# Patient Record
Sex: MALE | Race: WHITE | Employment: FULL TIME | URBAN - METROPOLITAN AREA
[De-identification: names, ages, dates, MRNs, and addresses within clinical notes are randomized per-mention and may not be internally consistent; named-entity substitution may affect disease eponyms.]

---

## 2021-03-15 ENCOUNTER — APPOINTMENT (OUTPATIENT)
Dept: GENERAL RADIOLOGY | Age: 66
End: 2021-03-15
Attending: EMERGENCY MEDICINE
Payer: COMMERCIAL

## 2021-03-15 ENCOUNTER — HOSPITAL ENCOUNTER (EMERGENCY)
Age: 66
Discharge: HOME | End: 2021-03-15
Attending: EMERGENCY MEDICINE
Payer: COMMERCIAL

## 2021-03-15 VITALS
RESPIRATION RATE: 20 BRPM | BODY MASS INDEX: 35.35 KG/M2 | WEIGHT: 233.25 LBS | OXYGEN SATURATION: 96 % | DIASTOLIC BLOOD PRESSURE: 70 MMHG | SYSTOLIC BLOOD PRESSURE: 136 MMHG | TEMPERATURE: 98 F | HEIGHT: 68 IN | HEART RATE: 67 BPM

## 2021-03-15 DIAGNOSIS — S61.012A THUMB LACERATION, LEFT, INITIAL ENCOUNTER: Primary | ICD-10-CM

## 2021-03-15 DIAGNOSIS — S66.229A LACERATION OF EXTENSOR MUSCLE AND TENDON OF THUMB AT WRIST AND HAND LEVEL: ICD-10-CM

## 2021-03-15 PROCEDURE — 90715 TDAP VACCINE 7 YRS/> IM: CPT | Performed by: EMERGENCY MEDICINE

## 2021-03-15 PROCEDURE — 90471 IMMUNIZATION ADMIN: CPT

## 2021-03-15 PROCEDURE — 74011250636 HC RX REV CODE- 250/636: Performed by: EMERGENCY MEDICINE

## 2021-03-15 PROCEDURE — 74011250637 HC RX REV CODE- 250/637: Performed by: EMERGENCY MEDICINE

## 2021-03-15 PROCEDURE — 75810000293 HC SIMP/SUPERF WND  RPR

## 2021-03-15 PROCEDURE — 74011000250 HC RX REV CODE- 250: Performed by: EMERGENCY MEDICINE

## 2021-03-15 PROCEDURE — 73140 X-RAY EXAM OF FINGER(S): CPT

## 2021-03-15 PROCEDURE — 99284 EMERGENCY DEPT VISIT MOD MDM: CPT

## 2021-03-15 RX ORDER — ROSUVASTATIN CALCIUM 10 MG/1
10 TABLET, COATED ORAL
COMMUNITY

## 2021-03-15 RX ORDER — AMOXICILLIN AND CLAVULANATE POTASSIUM 875; 125 MG/1; MG/1
1 TABLET, FILM COATED ORAL 2 TIMES DAILY
Qty: 20 TAB | Refills: 0 | Status: SHIPPED | OUTPATIENT
Start: 2021-03-15 | End: 2021-03-25

## 2021-03-15 RX ORDER — LIDOCAINE HYDROCHLORIDE 10 MG/ML
5 INJECTION, SOLUTION EPIDURAL; INFILTRATION; INTRACAUDAL; PERINEURAL ONCE
Status: COMPLETED | OUTPATIENT
Start: 2021-03-15 | End: 2021-03-15

## 2021-03-15 RX ORDER — BACITRACIN 500 UNIT/G
1 PACKET (EA) TOPICAL 3 TIMES DAILY
Status: DISCONTINUED | OUTPATIENT
Start: 2021-03-15 | End: 2021-03-16 | Stop reason: HOSPADM

## 2021-03-15 RX ORDER — AMOXICILLIN AND CLAVULANATE POTASSIUM 875; 125 MG/1; MG/1
1 TABLET, FILM COATED ORAL
Status: COMPLETED | OUTPATIENT
Start: 2021-03-15 | End: 2021-03-15

## 2021-03-15 RX ADMIN — BACITRACIN 1 PACKET: 500 OINTMENT TOPICAL at 20:45

## 2021-03-15 RX ADMIN — AMOXICILLIN AND CLAVULANATE POTASSIUM 1 TABLET: 875; 125 TABLET, FILM COATED ORAL at 21:36

## 2021-03-15 RX ADMIN — LIDOCAINE HYDROCHLORIDE 5 ML: 10 INJECTION, SOLUTION EPIDURAL; INFILTRATION; INTRACAUDAL; PERINEURAL at 20:45

## 2021-03-15 RX ADMIN — TETANUS TOXOID, REDUCED DIPHTHERIA TOXOID AND ACELLULAR PERTUSSIS VACCINE, ADSORBED 0.5 ML: 5; 2.5; 8; 8; 2.5 SUSPENSION INTRAMUSCULAR at 21:48

## 2021-03-16 NOTE — ED NOTES
Wound repair done by Dr. Raj Leon and dressing applied  With finger splint. Patient is discharged home with prescriptions and instructions. Verbalized understanding of aftercare instructions.  Dressing dry and intact, Pain scale 0/10

## 2021-03-16 NOTE — ED PROVIDER NOTES
HPI   67-year-old male presents with left posterior thumb laceration occurring just prior to arrival in ED. Patient states she was using a knife to unwed to frozen hamburgers when it slipped slicing his left thumb. Unknown tetanus status. Denies numbness but patient states he is unable to fully extend his thumb. Patient is right-handed. Past Medical History:   Diagnosis Date    High cholesterol        History reviewed. No pertinent surgical history. History reviewed. No pertinent family history. Social History     Socioeconomic History    Marital status:      Spouse name: Not on file    Number of children: Not on file    Years of education: Not on file    Highest education level: Not on file   Occupational History    Not on file   Social Needs    Financial resource strain: Not on file    Food insecurity     Worry: Not on file     Inability: Not on file    Transportation needs     Medical: Not on file     Non-medical: Not on file   Tobacco Use    Smoking status: Never Smoker    Smokeless tobacco: Never Used   Substance and Sexual Activity    Alcohol use:  Yes     Alcohol/week: 1.7 standard drinks     Types: 2 Glasses of wine per week    Drug use: No    Sexual activity: Yes     Partners: Female   Lifestyle    Physical activity     Days per week: Not on file     Minutes per session: Not on file    Stress: Not on file   Relationships    Social connections     Talks on phone: Not on file     Gets together: Not on file     Attends Shinto service: Not on file     Active member of club or organization: Not on file     Attends meetings of clubs or organizations: Not on file     Relationship status: Not on file    Intimate partner violence     Fear of current or ex partner: Not on file     Emotionally abused: Not on file     Physically abused: Not on file     Forced sexual activity: Not on file   Other Topics Concern    Not on file   Social History Narrative    Not on file ALLERGIES: Patient has no known allergies. Review of Systems   Musculoskeletal: Negative for arthralgias. Skin: Positive for wound. Negative for rash. Neurological: Positive for weakness. Negative for numbness. All other systems reviewed and are negative. Vitals:    03/15/21 2030 03/15/21 2035 03/15/21 2037 03/15/21 2045   BP: (!) 147/61 (!) 161/76  125/66   Pulse:  70     Resp:  20     Temp:  96.8 °F (36 °C)     SpO2: 98% 98% 98% 97%   Weight:  105.8 kg (233 lb 4 oz)     Height:  5' 8\" (1.727 m)              Physical Exam   Physical Examination: General appearance - alert, well appearing, and in no distress, oriented to person, place, and time and normal appearing weight  Neurological - alert, oriented, normal speech, no focal findings or movement disorder noted  Musculoskeletal - no joint tenderness, deformity or swelling, 2.75 cm laceration to left posterior thigh between interphalangeal joints, patient is unable to fully extend his left thumb, cap refill less than 2 seconds, sensation intact  Extremities - peripheral pulses normal, no pedal edema, no clubbing or cyanosis  Skin - normal coloration and turgor, no rashes, no suspicious skin lesions noted  MDM  Number of Diagnoses or Management Options     Amount and/or Complexity of Data Reviewed  Tests in the radiology section of CPT®: ordered and reviewed  Discuss the patient with other providers: yes (ortho)  Independent visualization of images, tracings, or specimens: yes    Patient Progress  Patient progress: improved         WOUND REPAIR    Date/Time: 3/15/2021 9:00 PM  Performed by: attendingPreparation: skin prepped with Betadine and sterile field established  Pre-procedure re-eval: Immediately prior to the procedure, the patient was reevaluated and found suitable for the planned procedure and any planned medications.   Time out: Immediately prior to the procedure a time out was called to verify the correct patient, procedure, equipment, staff and marking as appropriate. .  Location details: left thumb  Wound length:2.6 - 7.5 cm  Anesthesia: local infiltration    Anesthesia:  Local Anesthetic: lidocaine 1% without epinephrine  Anesthetic total: 1 mL  Foreign bodies: no foreign bodies  Irrigation solution: saline  Irrigation method: syringe  Debridement: none  Skin closure: 5-0 nylon  Number of sutures: 5  Technique: simple and interrupted  Approximation: close  Dressing: antibiotic ointment, splint and pressure dressing  Patient tolerance: patient tolerated the procedure well with no immediate complications  My total time at bedside, performing this procedure was 1-15 minutes. Splint, Finger    Date/Time: 3/15/2021 9:00 PM  Performed by: Floyd Major MD  Authorized by: Floyd Major MD     Consent:     Consent obtained:  Verbal    Consent given by:  Patient    Risks discussed:  Discoloration, numbness, pain and swelling    Alternatives discussed:  No treatment, delayed treatment, alternative treatment and observation  Universal protocol:     Procedure explained and questions answered to patient or proxy's satisfaction: yes      Relevant documents present and verified: yes      Test results available and properly labeled: yes      Imaging studies available: yes      Required blood products, implants, devices, and special equipment available: yes      Site/side marked: yes      Immediately prior to procedure a time out was called: yes      Patient identity confirmed:  Verbally with patient, arm band and provided demographic data  Pre-procedure details:     Sensation:  Weakness    Skin color:  Normal  Procedure details:     Laterality:  Left    Location:  Finger    Finger:  L thumb    Strapping: no      Splint type:  Finger    Supplies:  Aluminum splint  Post-procedure details:     Pain:  Improved    Sensation:  Weakness    Skin color:  Normal    Patient tolerance of procedure:   Tolerated well, no immediate complications  Comments: Pt splinted in full extension. 9:11 PM  Discussed with Dr. Mode Grullon, orthopedic surgery, and Brittnee Ledezma. They recommend loose approximation of wound, splinting in full extension, Augmentin antibiotic prescription. Recommend follow-up with Dr. Cristobal Grover, orthopedic hand surgery tomorrow in the office to plan for definitive treatment. Discussed with patient he agrees with plan.

## 2021-03-16 NOTE — ED TRIAGE NOTES
Pt ambulates to treatment area he states that he wedged a knife between 2 frozen hamburgers and sliced his left thumb. It is actively bleeding.   approx 2.5 cm

## 2021-03-16 NOTE — DISCHARGE INSTRUCTIONS
We hope that we have addressed all of your medical concerns. The examination and treatment you received in the Emergency Department were for an emergent problem and were not intended as complete care. It is important that you follow up with your healthcare provider(s) for ongoing care. If your symptoms worsen or do not improve as expected, and you are unable to reach your usual health care provider(s), you should return to the Emergency Department.      Today's healthcare is undergoing tremendous change, and patient satisfaction surveys are one of the many tools to assess the quality of medical care.  You may receive a survey from the [employer] regarding your experience in the Emergency Department.  I hope that your experience has been completely positive, particularly the medical care that I provided.  As such, please participate in the survey; anything less than excellent does not meet my expectations or intentions.        [facility], [facility] and [facility] [employer] participate in nationally recognized quality of care measures.  If your blood pressure is greater than 120/80, as reported below, we urge that you seek medical care to address the potential of high blood pressure, commonly known as hypertension.   Hypertension can be hereditary or can be caused by certain medical conditions, pain, stress, or \"white coat syndrome.\"       Please make an appointment with your health care provider(s) for follow up of your Emergency Department visit.       VITALS:   Patient Vitals for the past 8 hrs:   Temp Pulse Resp BP SpO2   03/15/21 2045 -- -- -- 125/66 97 %   03/15/21 2037 -- -- -- -- 98 %   03/15/21 2035 96.8 °F (36 °C) 70 20 (!) 161/76 98 %   03/15/21 2030 -- -- -- (!) 147/61 98 %          Thank you for allowing us to provide you with medical care today.  We realize that you have many choices for your emergency care needs.  Please choose us in the future for any continued  health care needs. Gregoria Simpson Maker, [address]: [phone]            No results found for this or any previous visit (from the past 24 hour(s)). Xr Thumb Lt Min 2 V    Result Date: 3/15/2021  EXAM: XR THUMB LT MIN 2 V INDICATION: laceration. COMPARISON: None. FINDINGS: Three views of the left thumb demonstrate no fracture or foreign body. There is osteoarthritis of the IP joint. No fracture or foreign body.

## 2022-05-31 ENCOUNTER — HOSPITAL ENCOUNTER (EMERGENCY)
Age: 67
Discharge: HOME | End: 2022-05-31
Attending: EMERGENCY MEDICINE
Payer: COMMERCIAL

## 2022-05-31 VITALS
HEART RATE: 77 BPM | DIASTOLIC BLOOD PRESSURE: 73 MMHG | SYSTOLIC BLOOD PRESSURE: 136 MMHG | RESPIRATION RATE: 15 BRPM | BODY MASS INDEX: 33.91 KG/M2 | TEMPERATURE: 98.1 F | WEIGHT: 223.77 LBS | HEIGHT: 68 IN | OXYGEN SATURATION: 98 %

## 2022-05-31 DIAGNOSIS — S01.01XA LACERATION OF SCALP, INITIAL ENCOUNTER: Primary | ICD-10-CM

## 2022-05-31 DIAGNOSIS — S09.90XA INJURY OF HEAD, INITIAL ENCOUNTER: ICD-10-CM

## 2022-05-31 PROCEDURE — 74011000250 HC RX REV CODE- 250: Performed by: EMERGENCY MEDICINE

## 2022-05-31 PROCEDURE — 75810000293 HC SIMP/SUPERF WND  RPR

## 2022-05-31 PROCEDURE — 99282 EMERGENCY DEPT VISIT SF MDM: CPT

## 2022-05-31 RX ORDER — LIDOCAINE HYDROCHLORIDE AND EPINEPHRINE 10; 10 MG/ML; UG/ML
1.5 INJECTION, SOLUTION INFILTRATION; PERINEURAL ONCE
Status: COMPLETED | OUTPATIENT
Start: 2022-05-31 | End: 2022-05-31

## 2022-05-31 RX ADMIN — LIDOCAINE HYDROCHLORIDE,EPINEPHRINE BITARTRATE 15 MG: 10; .01 INJECTION, SOLUTION INFILTRATION; PERINEURAL at 16:40

## 2022-05-31 NOTE — ED NOTES
The patient was discharged home by Dr Nedra Ross in stable condition. The patient is alert and oriented, in no respiratory distress. The patient's diagnosis, condition and treatment were explained. The patient expressed understanding. A discharge plan has been developed. A  was not involved in the process. Aftercare instructions were given. Pt ambulatory out of the ED. Stapled site is not oozing. Given non adhering dressing pads, a roll of gauze, and wound care directions.  Also has some chux to put behind his head in the car or a chair or pillow

## 2022-05-31 NOTE — ED PROVIDER NOTES
HPI patient fell and hit the back of his head on a square edge and has a 3 inch laceration to the occipital area. He denies LOC, neck or trunk pain. He has a minor abrasion to his right elbow. Past Medical History:   Diagnosis Date    High cholesterol        History reviewed. No pertinent surgical history. History reviewed. No pertinent family history. Social History     Socioeconomic History    Marital status:      Spouse name: Not on file    Number of children: Not on file    Years of education: Not on file    Highest education level: Not on file   Occupational History    Not on file   Tobacco Use    Smoking status: Never Smoker    Smokeless tobacco: Never Used   Substance and Sexual Activity    Alcohol use: Yes     Alcohol/week: 1.7 standard drinks     Types: 2 Glasses of wine per week    Drug use: No    Sexual activity: Yes     Partners: Female   Other Topics Concern    Not on file   Social History Narrative    Not on file     Social Determinants of Health     Financial Resource Strain:     Difficulty of Paying Living Expenses: Not on file   Food Insecurity:     Worried About Running Out of Food in the Last Year: Not on file    Gabbi of Food in the Last Year: Not on file   Transportation Needs:     Lack of Transportation (Medical): Not on file    Lack of Transportation (Non-Medical):  Not on file   Physical Activity:     Days of Exercise per Week: Not on file    Minutes of Exercise per Session: Not on file   Stress:     Feeling of Stress : Not on file   Social Connections:     Frequency of Communication with Friends and Family: Not on file    Frequency of Social Gatherings with Friends and Family: Not on file    Attends Quaker Services: Not on file    Active Member of Clubs or Organizations: Not on file    Attends Club or Organization Meetings: Not on file    Marital Status: Not on file   Intimate Partner Violence:     Fear of Current or Ex-Partner: Not on file  Emotionally Abused: Not on file    Physically Abused: Not on file    Sexually Abused: Not on file   Housing Stability:     Unable to Pay for Housing in the Last Year: Not on file    Number of Places Lived in the Last Year: Not on file    Unstable Housing in the Last Year: Not on file         ALLERGIES: Patient has no known allergies. Review of Systems   HENT: Negative for voice change. Eyes: Negative for visual disturbance. Respiratory: Negative for shortness of breath. Cardiovascular: Negative for chest pain. Gastrointestinal: Negative for abdominal pain, nausea and vomiting. Musculoskeletal: Negative for back pain. Skin: Positive for wound. Negative for rash. Neurological: Negative for headaches. Psychiatric/Behavioral: Negative for confusion. There were no vitals filed for this visit. Physical Exam  Constitutional:       General: He is not in acute distress. Appearance: He is well-developed. HENT:      Head: Normocephalic. Comments: 3 inch laceration to the left occipital area. There is no swelling or bony deformity. Eyes:      Pupils: Pupils are equal, round, and reactive to light. Cardiovascular:      Rate and Rhythm: Normal rate. Pulmonary:      Effort: Pulmonary effort is normal.   Musculoskeletal:         General: Normal range of motion. Cervical back: Normal range of motion. Skin:     General: Skin is warm and dry. Capillary Refill: Capillary refill takes less than 2 seconds. Comments: Abrasion to the right elbow. Neurological:      Mental Status: He is alert.    Psychiatric:         Behavior: Behavior normal.          [facility]       Wound Repair    Date/Time: 5/31/2022 4:32 PM  Performed by: attendingPreparation: skin prepped with Betadine  Location details: scalp  Anesthesia: local infiltration    Anesthesia:  Local Anesthetic: lidocaine 1% with epinephrine  Skin closure: staples  Patient tolerance: patient tolerated the procedure well with no immediate complications  My total time at bedside, performing this procedure was 1-15 minutes.

## 2022-05-31 NOTE — ED TRIAGE NOTES
Patient fell down a brick step and hit the back of his head; has a laceration with bleeding under control at this time. No LOC. Wife at bedside. Incident happened within the past hour.